# Patient Record
Sex: FEMALE | Race: WHITE
[De-identification: names, ages, dates, MRNs, and addresses within clinical notes are randomized per-mention and may not be internally consistent; named-entity substitution may affect disease eponyms.]

---

## 2020-02-19 ENCOUNTER — HOSPITAL ENCOUNTER (EMERGENCY)
Dept: HOSPITAL 88 - FSED | Age: 35
Discharge: HOME | End: 2020-02-19
Payer: SELF-PAY

## 2020-02-19 VITALS — BODY MASS INDEX: 50.02 KG/M2 | HEIGHT: 64 IN | WEIGHT: 293 LBS

## 2020-02-19 VITALS — SYSTOLIC BLOOD PRESSURE: 105 MMHG | DIASTOLIC BLOOD PRESSURE: 57 MMHG

## 2020-02-19 DIAGNOSIS — F17.210: ICD-10-CM

## 2020-02-19 DIAGNOSIS — L03.818: Primary | ICD-10-CM

## 2020-02-19 PROCEDURE — 99284 EMERGENCY DEPT VISIT MOD MDM: CPT

## 2020-02-19 PROCEDURE — 96372 THER/PROPH/DIAG INJ SC/IM: CPT

## 2020-02-19 PROCEDURE — 81003 URINALYSIS AUTO W/O SCOPE: CPT

## 2020-02-19 NOTE — XMS REPORT
Patient Summary Document

                             Created on: 2020



VIVIANE ROA

External Reference #: [id number]

: 1985

Sex: Female



Demographics







                          Address                   [address]

[place]  [address]

 

                          Home Phone                [phone]

 

                          Preferred Language        Unknown

 

                          Marital Status            Unknown

 

                          Tenriism Affiliation     Unknown

 

                          Race                      Unknown

 

                          Ethnic Group              Unknown





Author







                          Author                    [facility]

 

                          [facility]

 

                          Address                   Unknown

 

                          Phone                     Unavailable







Support







                Name            Relationship    Address         Phone

 

                    KAITLIN ROA     PRS                 [address] [place]  [address]                      [phone]

 

                    GRACE ROA       PRS                 [address] [place]  [address]                      [phone]

 

                    ERNA GAMBLE      PRS                 [address]



[place]  [address]                     [phone]

 

                    CEDRIC DAS    PRS                 [address]



[place]  [address]                     [phone]







Care Team Providers







                    Care Team Member Name    Role                Phone

 

                          Unavailable               Unavailable







Payers







             Payer Name    Policy Type    Policy Number    Effective Date    Expiration Date







Problems

This patient has no known problems.



Allergies, Adverse Reactions, Alerts







          Allergy Name    Allergy Type    Status    Severity    Reaction(s)    Onset Date    Inactive 

Date                      Treating Clinician        Comments

 

        tramadol    DA      Active    SV              2019-10-10 00:00:00                     

 

        No Known Contrast Allergies    DA      Active    U               2009 00:00:00                     

 

        No Known Drug Allergies    DA      Active    U               2009 00:00:00                     

 

        No Known Food Allergies    DA      Active    U               2009 00:00:00                     

 

        No Known Other Allergies    DA      Active    U               2009 00:00:00                     







Medications

This patient has no known medications.



Results







           Test Description    Test Time    Test Comments    Text Results    Atomic Results    Result

 Comments

 

                UR HCG QUAL     2019-10-10 14:23:00                      

 

   

 

                UR HCG QUAL (test code=HCGQLU)    NEGATIVE                        This HCGQL test is NOT applicable for

 MALE patients.Check with nurse about probable order error.If Tumor Marker Test 
needed, nurse should order test "HCGTU"(Test 
#[id number])-------------------------------------------------------

## 2020-03-08 ENCOUNTER — HOSPITAL ENCOUNTER (EMERGENCY)
Dept: HOSPITAL 88 - ER | Age: 35
Discharge: HOME | End: 2020-03-08
Payer: SELF-PAY

## 2020-03-08 VITALS — DIASTOLIC BLOOD PRESSURE: 95 MMHG | SYSTOLIC BLOOD PRESSURE: 152 MMHG

## 2020-03-08 VITALS — WEIGHT: 293 LBS | BODY MASS INDEX: 50.02 KG/M2 | HEIGHT: 64 IN

## 2020-03-08 DIAGNOSIS — J20.9: ICD-10-CM

## 2020-03-08 DIAGNOSIS — R05: Primary | ICD-10-CM

## 2020-03-08 LAB
FLUAV + FLUBV AG SPEC IF: NEGATIVE
S PYO AG THROAT QL: NEGATIVE

## 2020-03-08 PROCEDURE — 87400 INFLUENZA A/B EACH AG IA: CPT

## 2020-03-08 PROCEDURE — 87070 CULTURE OTHR SPECIMN AEROBIC: CPT

## 2020-03-08 PROCEDURE — 83518 IMMUNOASSAY DIPSTICK: CPT

## 2020-03-08 PROCEDURE — 99283 EMERGENCY DEPT VISIT LOW MDM: CPT

## 2020-03-08 PROCEDURE — 87186 SC STD MICRODIL/AGAR DIL: CPT
